# Patient Record
Sex: FEMALE | Race: ASIAN | NOT HISPANIC OR LATINO | Employment: FULL TIME | URBAN - METROPOLITAN AREA
[De-identification: names, ages, dates, MRNs, and addresses within clinical notes are randomized per-mention and may not be internally consistent; named-entity substitution may affect disease eponyms.]

---

## 2018-11-15 ENCOUNTER — TRANSFERRED RECORDS (OUTPATIENT)
Dept: HEALTH INFORMATION MANAGEMENT | Facility: CLINIC | Age: 53
End: 2018-11-15

## 2019-02-15 ENCOUNTER — TELEPHONE (OUTPATIENT)
Dept: OTOLARYNGOLOGY | Facility: CLINIC | Age: 54
End: 2019-02-15

## 2019-02-15 NOTE — TELEPHONE ENCOUNTER
Called out to the [facility].  Dr. Goodwin was already out of office and the referral wasn't in the chart. So there was a message left for Dr. Goodwin for her to send over that referral info through fax to our ENT department.  Also asked for clarification on if the referral is for Facial pain or for ENT.

## 2019-02-15 NOTE — TELEPHONE ENCOUNTER
Health Call Center    Phone Message    May a detailed message be left on voicemail: yes    Reason for Call: Other: Pt is being referred to the facial pain clinic by Dr Fabienne Goodwin at the [facility] TMD/Orofacial Pain clinic. Pt is in active treatment for left TMJ arthralgia, myofascial pain, and tension type headaches. Would like to rule out ear pathology as pt has modest improvement with current treatment plan. Please contact pt to discuss scheduling     Action Taken: Message routed to:  [facility] ENT Facial Pain Clinic

## 2019-02-19 NOTE — TELEPHONE ENCOUNTER
Dr. Goodwin called in looking for update on Referral and appt. with Dr. Rivera.  To further clarify; Dr. Goodwin is with Facial pain, and would like Dr. Rivera to rule our Ear Pathology...

## 2019-02-20 ENCOUNTER — DOCUMENTATION ONLY (OUTPATIENT)
Dept: CARE COORDINATION | Facility: CLINIC | Age: 54
End: 2019-02-20

## 2019-03-21 DIAGNOSIS — H91.90 HEARING LOSS, UNSPECIFIED HEARING LOSS TYPE, UNSPECIFIED LATERALITY: Primary | ICD-10-CM

## 2019-03-22 ENCOUNTER — OFFICE VISIT (OUTPATIENT)
Dept: AUDIOLOGY | Facility: CLINIC | Age: 54
End: 2019-03-22
Payer: COMMERCIAL

## 2019-03-22 ENCOUNTER — OFFICE VISIT (OUTPATIENT)
Dept: OTOLARYNGOLOGY | Facility: CLINIC | Age: 54
End: 2019-03-22
Payer: COMMERCIAL

## 2019-03-22 ENCOUNTER — PRE VISIT (OUTPATIENT)
Dept: OTOLARYNGOLOGY | Facility: CLINIC | Age: 54
End: 2019-03-22

## 2019-03-22 VITALS
BODY MASS INDEX: 22.63 KG/M2 | SYSTOLIC BLOOD PRESSURE: 96 MMHG | HEIGHT: 62 IN | WEIGHT: 123 LBS | DIASTOLIC BLOOD PRESSURE: 73 MMHG | HEART RATE: 61 BPM

## 2019-03-22 DIAGNOSIS — H91.93 HEARING DISORDER OF BOTH EARS: ICD-10-CM

## 2019-03-22 DIAGNOSIS — H93.13 TINNITUS OF BOTH EARS: Primary | ICD-10-CM

## 2019-03-22 DIAGNOSIS — M26.609 TMJ (TEMPOROMANDIBULAR JOINT SYNDROME): Primary | ICD-10-CM

## 2019-03-22 DIAGNOSIS — H91.90 HEARING LOSS, UNSPECIFIED HEARING LOSS TYPE, UNSPECIFIED LATERALITY: ICD-10-CM

## 2019-03-22 ASSESSMENT — MIFFLIN-ST. JEOR: SCORE: 1108.17

## 2019-03-22 ASSESSMENT — PAIN SCALES - GENERAL: PAINLEVEL: NO PAIN (0)

## 2019-03-22 NOTE — NURSING NOTE
"Chief Complaint   Patient presents with     Consult     orofacial pain    Blood pressure 96/73, pulse 61, height 1.57 m (5' 1.81\"), weight 55.8 kg (123 lb), not currently breastfeeding.      Nitin Antoine LPN    "

## 2019-03-22 NOTE — PROGRESS NOTES
AUDIOLOGY REPORT    SUMMARY: Audiology visit completed. See audiogram for results.      RECOMMENDATIONS: Follow-up with ENT.      Fransico Noyola.  Licensed Audiologist  [place] #[id number]

## 2019-03-22 NOTE — PATIENT INSTRUCTIONS
1.  You were seen in the ENT Clinic today by Dr. Rivera.  If you have any questions or concerns after your appointment, please call [phone]. Press option #1 for scheduling related needs.     2.  Please schedule an appointment for the following:   - Auditory Processing Evaluation. This is an external referral. You will be given this today. You will need to call one of the suggested clinics to set up an appointment. We can also fax this to the clinic you choose to schedule with. Please reach out to the clinic if you would like us to do this.         Bettye ACOSTA RN    The patient presents with a history of temporomandibular joint disorder and difficulty understanding speech at times. She will continue to work with the [facility] to manage her temporomandibular joint disorder and she will be referred to Morelia Olivares,  for evaluation of a possible Speech Processing Disorder.

## 2019-03-22 NOTE — PROGRESS NOTES
The patient presents with a history of difficulty understanding speech and with temporomandibular joint disorder. She is currently being treated for temporomandibular joint disorder with a dental appliance and this has been helpful to her. Her discomfort is improved. The patient denies sinusitis, rhinitis, facial pain, nasal obstruction or purulent nasal discharge. The patient denies chronic or recurrent tonsillitis, chronic or recurrent pharyngitis. The patient denies otalgia, otorrhea, eustachian tube dysfunction, ear infections, dizziness or tinnitus. Her Audiogram and Tympanogram are reviewed with her and they demonstrate normal hearing and 100% word recognition scores bilaterally with normal tympanograms.      This patient is seen in consultation at the request of Dr. Gloria Roberts.     All other systems were reviewed and they are either negative or they are not directly pertinent to this Otolaryngology examination.      Past Medical History:    Past Medical History:   Diagnosis Date     Back pain      Hearing problem     loss on left side     Irritable bowel syndrome      Migraines     visual     Problem, psychiatric     stress     Somatic dysfunction      Vitamin D deficiency        Past Surgical History:    Past Surgical History:   Procedure Laterality Date     EYE SURGERY      eye lift both eyes     HYSTERECTOMY, PAP NO LONGER INDICATED      lap hysterctomy, cervix removed, benign     nasa l surgery      septal surg with plastic surgery       Medications:      Current Outpatient Medications:      omeprazole (PRILOSEC) 20 MG capsule, Take 1 capsule (20 mg) by mouth daily 30-60 minutes before eating. (Patient not taking: Reported on 3/22/2019), Disp: 30 capsule, Rfl: 0    Allergies:    Animal dander; Dust mite extract; Mold; Perfume; and Pollen extract    Physical Examination:    The patient is a well developed, well nourished female in no apparent distress.  She is normocephalic, atraumatic with pupils  equally round and reactive to light.    Oral Cavity Examination:  Normal mucosa with no masses or lesions  Nasal Examination: Normal mucosa with no masses or lesions  Ear Examination: Ear canals clear, tympanic membranes and middle ear spaces normal  Neurological Examination: Facial nerve function intact and symmetric  Integumentary Examination: No lesions on the skin of the head and neck  Neck Examination: No masses or lesions, no lymphadenopathy  Endocrine Examination: Normal thyroid examination  Temporomandibular Joint Examination: Malrotation of the temporomandibular joints bilaterally.     Assessment and Plan:    The patient presents with a history of temporomandibular joint disorder and difficulty understanding speech at times. She will continue to work with the [facility] to manage her temporomandibular joint disorder and she will be referred to Morelia Olivares, PhD for evaluation of a possible Speech Processing Disorder.     CC: Dr. Gloria Roberts

## 2019-03-22 NOTE — LETTER
RE: Roxana Yadav  [address] [place] [address]     Dear Colleague,    Thank you for referring your patient, Roxana Yadav, to the [facility] EAR [facility]SE AND [facility]AT at [facility]. Please see a copy of my visit note below.    The patient presents with a history of difficulty understanding speech and with temporomandibular joint disorder. She is currently being treated for temporomandibular joint disorder with a dental appliance and this has been helpful to her. Her discomfort is improved. The patient denies sinusitis, rhinitis, facial pain, nasal obstruction or purulent nasal discharge. The patient denies chronic or recurrent tonsillitis, chronic or recurrent pharyngitis. The patient denies otalgia, otorrhea, eustachian tube dysfunction, ear infections, dizziness or tinnitus. Her Audiogram and Tympanogram are reviewed with her and they demonstrate normal hearing and 100% word recognition scores bilaterally with normal tympanograms.      This patient is seen in consultation at the request of Dr. Gloria Roberts.     All other systems were reviewed and they are either negative or they are not directly pertinent to this Otolaryngology examination.      Past Medical History:    Past Medical History:   Diagnosis Date     Back pain      Hearing problem     loss on left side     Irritable bowel syndrome      Migraines     visual     Problem, psychiatric     stress     Somatic dysfunction      Vitamin D deficiency        Past Surgical History:    Past Surgical History:   Procedure Laterality Date     EYE SURGERY      eye lift both eyes     HYSTERECTOMY, PAP NO LONGER INDICATED      lap hysterctomy, cervix removed, benign     nasa l surgery      septal surg with plastic surgery     Medications:    Current Outpatient Medications:      omeprazole (PRILOSEC) 20 MG capsule, Take 1 capsule (20 mg) by mouth daily 30-60 minutes before eating. (Patient not taking: Reported  on 3/22/2019), Disp: 30 capsule, Rfl: 0    Allergies:    Animal dander; Dust mite extract; Mold; Perfume; and Pollen extract    Physical Examination:    The patient is a well developed, well nourished female in no apparent distress.  She is normocephalic, atraumatic with pupils equally round and reactive to light.    Oral Cavity Examination:  Normal mucosa with no masses or lesions  Nasal Examination: Normal mucosa with no masses or lesions  Ear Examination: Ear canals clear, tympanic membranes and middle ear spaces normal  Neurological Examination: Facial nerve function intact and symmetric  Integumentary Examination: No lesions on the skin of the head and neck  Neck Examination: No masses or lesions, no lymphadenopathy  Endocrine Examination: Normal thyroid examination  Temporomandibular Joint Examination: Malrotation of the temporomandibular joints bilaterally.     Assessment and Plan:    The patient presents with a history of temporomandibular joint disorder and difficulty understanding speech at times. She will continue to work with the [facility] to manage her temporomandibular joint disorder and she will be referred to Morelia Olivares, PhD for evaluation of a possible Speech Processing Disorder.     CC: Dr. Gloria Roberts    Again, thank you for allowing me to participate in the care of your patient.      Sincerely,    Mike Rivera MD

## 2019-06-21 ENCOUNTER — TELEPHONE (OUTPATIENT)
Dept: OPHTHALMOLOGY | Facility: CLINIC | Age: 54
End: 2019-06-21

## 2019-07-10 ENCOUNTER — OFFICE VISIT (OUTPATIENT)
Dept: OPTOMETRY | Facility: CLINIC | Age: 54
End: 2019-07-10
Payer: COMMERCIAL

## 2019-07-10 ENCOUNTER — APPOINTMENT (OUTPATIENT)
Dept: OPTOMETRY | Facility: CLINIC | Age: 54
End: 2019-07-10

## 2019-07-10 DIAGNOSIS — H52.4 MYOPIA WITH PRESBYOPIA OF BOTH EYES: Primary | ICD-10-CM

## 2019-07-10 DIAGNOSIS — H04.123 DRY EYES: ICD-10-CM

## 2019-07-10 DIAGNOSIS — H52.13 MYOPIA WITH PRESBYOPIA OF BOTH EYES: Primary | ICD-10-CM

## 2019-07-10 ASSESSMENT — REFRACTION_CURRENTRX
OS_SPHERE: -4.50
OS_BASECURVE: 8.5
OS_DIAMETER: 14.1
OS_ADD: HIGH
OD_ADD: MED
OD_DIAMETER: 14.1
OS_BRAND: DAILIES TOTAL 1 MULTIFOCAL
OD_SPHERE: -3.50
OD_BASECURVE: 8.5
OD_BRAND: DAILIES TOTAL 1 MULTIFOCAL

## 2019-07-10 ASSESSMENT — EXTERNAL EXAM - RIGHT EYE: OD_EXAM: NORMAL

## 2019-07-10 ASSESSMENT — CONF VISUAL FIELD
OS_NORMAL: 1
OD_NORMAL: 1

## 2019-07-10 ASSESSMENT — REFRACTION_WEARINGRX
OD_CYLINDER: +1.00
OS_CYLINDER: SPHERE
OD_SPHERE: -4.25
OD_AXIS: 110
OS_ADD: +2.25
OS_SPHERE: -4.75
OD_ADD: +2.25

## 2019-07-10 ASSESSMENT — CUP TO DISC RATIO
OD_RATIO: 0.50
OS_RATIO: 0.50

## 2019-07-10 ASSESSMENT — VISUAL ACUITY
METHOD: SNELLEN - LINEAR
CORRECTION_TYPE: GLASSES
OS_CC: 20/20
OD_CC: 20/20

## 2019-07-10 ASSESSMENT — REFRACTION_MANIFEST
OD_SPHERE: -4.25
OD_CYLINDER: +1.00
OS_SPHERE: -4.75
OS_CYLINDER: SPHERE
OD_AXIS: 105

## 2019-07-10 ASSESSMENT — SLIT LAMP EXAM - LIDS
COMMENTS: 1+ MGD
COMMENTS: 1+ MGD

## 2019-07-10 ASSESSMENT — TONOMETRY
OD_IOP_MMHG: 14
OS_IOP_MMHG: 11
IOP_METHOD: ICARE

## 2019-07-10 ASSESSMENT — EXTERNAL EXAM - LEFT EYE: OS_EXAM: NORMAL

## 2019-07-10 NOTE — PROGRESS NOTES
A/P  1.) Myopia/Presbyopia each eye  -Doing well in current glasses, alternates full PAL's and computer PAL's. No change in Rx today  -Part-time CL wearer for social occasions. Previously in monovision left eye near with decent response, but improved with MF today  -CL trials given, Rx updated. Okay to order if working well. If she does not prefer MF optics we can return to monovision in similar power to habitual, but I would rec switching to AV Oasys 1 day (Astig right eye)    2.) Dry Eye each eye  -Start AT 2-3x/day, warm compresses  -Reviewed further treatment options if eyes continue to bother her, but we will start conservatively    Monitor 1 year eye exam, sooner prn

## 2019-09-26 ENCOUNTER — TELEPHONE (OUTPATIENT)
Dept: GASTROENTEROLOGY | Facility: CLINIC | Age: 54
End: 2019-09-26

## 2019-09-26 ENCOUNTER — OFFICE VISIT (OUTPATIENT)
Dept: INTERNAL MEDICINE | Facility: CLINIC | Age: 54
End: 2019-09-26
Payer: COMMERCIAL

## 2019-09-26 VITALS
DIASTOLIC BLOOD PRESSURE: 53 MMHG | TEMPERATURE: 97.7 F | BODY MASS INDEX: 23.93 KG/M2 | WEIGHT: 121.9 LBS | SYSTOLIC BLOOD PRESSURE: 85 MMHG | OXYGEN SATURATION: 95 % | HEIGHT: 60 IN | HEART RATE: 67 BPM

## 2019-09-26 DIAGNOSIS — E55.9 VITAMIN D DEFICIENCY: ICD-10-CM

## 2019-09-26 DIAGNOSIS — Z86.79 HISTORY OF IRREGULAR HEARTBEAT: ICD-10-CM

## 2019-09-26 DIAGNOSIS — Z76.89 ENCOUNTER TO ESTABLISH CARE WITH NEW DOCTOR: Primary | ICD-10-CM

## 2019-09-26 DIAGNOSIS — R06.02 SHORTNESS OF BREATH: ICD-10-CM

## 2019-09-26 DIAGNOSIS — E78.2 MIXED HYPERLIPIDEMIA: ICD-10-CM

## 2019-09-26 DIAGNOSIS — Z12.39 BREAST CANCER SCREENING: ICD-10-CM

## 2019-09-26 DIAGNOSIS — Z12.11 COLON CANCER SCREENING: ICD-10-CM

## 2019-09-26 LAB
ANION GAP SERPL CALCULATED.3IONS-SCNC: 6 MMOL/L (ref 3–14)
BUN SERPL-MCNC: 14 MG/DL (ref 7–30)
CALCIUM SERPL-MCNC: 9.1 MG/DL (ref 8.5–10.1)
CHLORIDE SERPL-SCNC: 104 MMOL/L (ref 94–109)
CHOLEST SERPL-MCNC: 238 MG/DL
CO2 SERPL-SCNC: 28 MMOL/L (ref 20–32)
CREAT SERPL-MCNC: 0.63 MG/DL (ref 0.52–1.04)
DEPRECATED CALCIDIOL+CALCIFEROL SERPL-MC: 33 UG/L (ref 20–75)
ERYTHROCYTE [DISTWIDTH] IN BLOOD BY AUTOMATED COUNT: 12.3 % (ref 10–15)
GFR SERPL CREATININE-BSD FRML MDRD: >90 ML/MIN/{1.73_M2}
GLUCOSE SERPL-MCNC: 93 MG/DL (ref 70–99)
HCT VFR BLD AUTO: 42.8 % (ref 35–47)
HDLC SERPL-MCNC: 117 MG/DL
HGB BLD-MCNC: 14 G/DL (ref 11.7–15.7)
LDLC SERPL CALC-MCNC: 106 MG/DL
MCH RBC QN AUTO: 31.6 PG (ref 26.5–33)
MCHC RBC AUTO-ENTMCNC: 32.7 G/DL (ref 31.5–36.5)
MCV RBC AUTO: 97 FL (ref 78–100)
NONHDLC SERPL-MCNC: 122 MG/DL
PLATELET # BLD AUTO: 230 10E9/L (ref 150–450)
POTASSIUM SERPL-SCNC: 3.9 MMOL/L (ref 3.4–5.3)
RBC # BLD AUTO: 4.43 10E12/L (ref 3.8–5.2)
SODIUM SERPL-SCNC: 139 MMOL/L (ref 133–144)
TRIGL SERPL-MCNC: 79 MG/DL
TSH SERPL DL<=0.005 MIU/L-ACNC: 2.1 MU/L (ref 0.4–4)
WBC # BLD AUTO: 4 10E9/L (ref 4–11)

## 2019-09-26 ASSESSMENT — ENCOUNTER SYMPTOMS
MUSCLE CRAMPS: 0
NAIL CHANGES: 0
DIFFICULTY URINATING: 1
PALPITATIONS: 1
NECK MASS: 0
HEADACHES: 1
DECREASED CONCENTRATION: 1
EXERCISE INTOLERANCE: 1
ARTHRALGIAS: 1
DYSURIA: 0
ORTHOPNEA: 0
SINUS PAIN: 0
LEG PAIN: 1
INCREASED ENERGY: 1
POLYDIPSIA: 0
LOSS OF CONSCIOUSNESS: 0
HEMATURIA: 0
WEAKNESS: 1
SEIZURES: 0
TASTE DISTURBANCE: 0
DECREASED LIBIDO: 1
WEIGHT LOSS: 0
TREMORS: 0
STIFFNESS: 1
SLEEP DISTURBANCES DUE TO BREATHING: 0
MUSCLE WEAKNESS: 1
FEVER: 0
FLANK PAIN: 0
DECREASED APPETITE: 0
SINUS CONGESTION: 0
PARALYSIS: 0
HYPERTENSION: 0
DEPRESSION: 1
SYNCOPE: 0
ALTERED TEMPERATURE REGULATION: 0
DISTURBANCES IN COORDINATION: 0
HOARSE VOICE: 0
NECK PAIN: 1
SMELL DISTURBANCE: 0
SKIN CHANGES: 0
SPEECH CHANGE: 0
NUMBNESS: 0
CHILLS: 0
PANIC: 0
TINGLING: 0
JOINT SWELLING: 0
HOT FLASHES: 0
FATIGUE: 1
SORE THROAT: 0
MEMORY LOSS: 0
POLYPHAGIA: 0
HYPOTENSION: 0
INSOMNIA: 1
DIZZINESS: 0
HALLUCINATIONS: 0
NIGHT SWEATS: 0
WEIGHT GAIN: 0
BACK PAIN: 1
POOR WOUND HEALING: 0
TROUBLE SWALLOWING: 0
MYALGIAS: 1
NERVOUS/ANXIOUS: 1
LIGHT-HEADEDNESS: 0

## 2019-09-26 ASSESSMENT — MIFFLIN-ST. JEOR: SCORE: 1068.81

## 2019-09-26 ASSESSMENT — PAIN SCALES - GENERAL: PAINLEVEL: NO PAIN (0)

## 2019-09-26 NOTE — NURSING NOTE
Chief Complaint   Patient presents with     Establish Care     pt here to establish care       Asiya Jeffery CMA at 8:24 AM on 9/26/2019.

## 2019-09-26 NOTE — PATIENT INSTRUCTIONS
[facility] Medication Refill Request Information:  * Please contact your pharmacy regarding ANY request for medication refills.  ** [facility] Prescription Fax = [phone]  * Please allow 3 business days for routine medication refills.  * Please allow 5 business days for controlled substance medication refills.     [facility] Test Result notification information:  *You will be notified with in 7-10 days of your appointment day regarding the results of your test.  If you are on MyChart you will be notified as soon as the provider has reviewed the results and signed off on them.    [facility]: [phone]

## 2019-09-26 NOTE — PROGRESS NOTES
CC: establish care  HPI    Ms. Roxana Yadav is a 54 year old female with a history of dyslipidemia, TMJ, and migraines who presents for an annual check up and establishment of care.   The difference between establish care, problem-based and routine physical office visits was discussed with the patient who opted to discuss (multiple) problems today.  We reviewed all of her concerns and agreed to focus on her most concerning ones first. Complete past medical history was reviewed as well.    # Dyspnea on exertion  Patient has been running 4-5 blocks >/= 5 days a week since June and has felt disproportional amount of SOB with activity, fatigue, knees giving out, and poor coordination. Prior to this she had not been exercising regularly. She denies similar symptoms in the past.  No associated chest pain or myalgias.    # Heart pounding  Patient intermittently, spontaneously develops pounding in her chest. This most recently occurred when she was at rest in bed last night. She limits caffeine intake and does not take any medications regularly. She had cardiac imaging in the past, but this was negative for findings. No associated loss of consciousness, chest pain, SOB, lightheadedness, or falls.    # other concerns:  -would like a skin survey for moles     Answers for HPI/ROS submitted by the patient on 9/26/2019--these were acknowledged and will continue to be addressed at subsequent visits   General Symptoms: Yes  Skin Symptoms: Yes  HENT Symptoms: Yes  EYE SYMPTOMS: No  HEART SYMPTOMS: Yes  LUNG SYMPTOMS: No  INTESTINAL SYMPTOMS: No  URINARY SYMPTOMS: Yes  GYNECOLOGIC SYMPTOMS: Yes  BREAST SYMPTOMS: No  SKELETAL SYMPTOMS: Yes  BLOOD SYMPTOMS: No  NERVOUS SYSTEM SYMPTOMS: Yes  MENTAL HEALTH SYMPTOMS: Yes  Fever: No  Loss of appetite: No  Weight loss: No  Weight gain: No  Fatigue: Yes  Night sweats: No  Chills: No  Increased stress: Yes  Excessive hunger: No  Excessive thirst: No  Feeling hot or cold when others believe  the temperature is normal: No  Loss of height: No  Post-operative complications: No  Surgical site pain: No  Hallucinations: No  Change in or Loss of Energy: Yes  Hyperactivity: No  Confusion: No  Changes in hair: No  Changes in moles/birth marks: No  Itching: Yes  Rashes: No  Changes in nails: No  Acne: No  Hair in places you don't want it: No  Change in facial hair: Yes  Non-healing sores: No  Scarring: No  Flaking of skin: No  Color changes of hands/feet in cold : No  Sun sensitivity: No  Skin thickening: No  Ear pain: Yes  Ear discharge: No  Hearing loss: Yes  Tinnitus: Yes  Nosebleeds: No  Congestion: No  Sinus pain: No  Trouble swallowing: No   Voice hoarseness: No  Mouth sores: No  Sore throat: No  Tooth pain: Yes  Gum tenderness: Yes  Bleeding gums: No  Change in taste: No  Change in sense of smell: No  Dry mouth: No  Hearing aid used: No  Neck lump: No  Chest pain or pressure: No  Fast or irregular heartbeat: Yes  Pain in legs with walking: Yes  Trouble breathing while lying down: No  Fingers or toes appear blue: No  High blood pressure: No  Low blood pressure: No  Fainting: No  Murmurs: No  Pacemaker: No  Varicose veins: Yes  Edema or swelling: Yes  Wake up at night with shortness of breath: No  Light-headedness: No  Exercise intolerance: Yes  Trouble holding urine or incontinence: Yes  Pain or burning: No  Trouble starting or stopping: No  Increased frequency of urination: Yes  Blood in urine: No  Decreased frequency of urination: No  Frequent nighttime urination: No  Flank pain: No  Difficulty emptying bladder: Yes  Back pain: Yes  Muscle aches: Yes  Neck pain: Yes  Swollen joints: No  Joint pain: Yes  Bone pain: No  Muscle cramps: No  Muscle weakness: Yes  Joint stiffness: Yes  Bone fracture: No  Trouble with coordination: No  Dizziness or trouble with balance: No  Fainting or black-out spells: No  Memory loss: No  Headache: Yes  Seizures: No  Speech problems: No  Tingling: No  Tremor: No  Weakness:  Yes  Difficulty walking: No  Paralysis: No  Numbness: No  Bleeding or spotting between periods: No  Heavy or painful periods: No  Irregular periods: No  Vaginal discharge: Yes  Hot flashes: No  Vaginal dryness: Yes  Genital ulcers: No  Reduced libido: Yes  Painful intercourse: Yes  Difficulty with sexual arousal: Yes  Post-menopausal bleeding: No  Nervous or Anxious: Yes  Depression: Yes  Trouble sleeping: Yes  Trouble thinking or concentrating: Yes  Mood changes: Yes  Panic attacks: No      Past Medical History:   Diagnosis Date     Back pain      Hearing problem     loss on left side     Irritable bowel syndrome      Migraines     visual     Problem, psychiatric     stress     Somatic dysfunction      Vitamin D deficiency      Past Surgical History:   Procedure Laterality Date     EYE SURGERY      eye lift both eyes     HYSTERECTOMY, PAP NO LONGER INDICATED      lap hysterctomy, cervix removed, benign     nasa l surgery      septal surg with plastic surgery     Family History   Problem Relation Age of Onset     Neurologic Disorder Mother      Blood Disease Mother      Depression Mother      Muscular Disorder Mother      Family History Negative Father      Depression Father      Muscular Disorder Father      Macular Degeneration Father      Blood Disease Sister         Low red blood cells     Glaucoma Sister      Blood Disease Sister         Low red blood cell cound     Depression Sister      Muscular Disorder Sister      Depression Sister      Muscular Disorder Sister      Glaucoma Other      Breast Cancer No family hx of      Social History     Socioeconomic History     Marital status:      Spouse name: None     Number of children: None     Years of education: None     Highest education level: None   Tobacco Use     Smoking status: Never Smoker     Smokeless tobacco: Never Used   Substance and Sexual Activity     Alcohol use: No     Drug use: No     Sexual activity: Yes     Partners: Male     Birth  "control/protection: None       Exam:  BP (!) 85/53 (BP Location: Right arm, Patient Position: Sitting, Cuff Size: Adult Regular)   Pulse 67   Temp 97.7  F (36.5  C) (Oral)   Ht 1.523 m (4' 11.96\")   Wt 55.3 kg (121 lb 14.4 oz)   SpO2 95%   BMI 23.84 kg/m    121 lbs 14.4 oz    Physical exam -performed by Dr. Avila  Physical Examination:    General:  Conversant, generally healthy appearing, no acute distress  Head: atraumatic  Eyes:  Pupils 2-3 mm, sclera white, EOM's full, conjunctiva moist, no periorbital swelling    Neck:  Trachea midline, Full AROM, supple, thyroid smooth, symmetric, not enlarged, no nodules, no neck lymphadenopathy  Lungs:  Clear to auscultation throughout, no wheezes, rhonchi or rales. Normal respiratory effort and no intercostal retractions.  C/V:  Regular rate and rhythm, no murmurs, rubs or gallops.  No JVD, no carotid bruits. Radial and DP pulses 2+, equal and regular.  Abdomen:  Not distended.  Bowel sounds active.  No tenderness, no hepatosplenomegaly or masses.  No CVA tenderness or masses.  Lymph:  No cervical lymph nodes.  Neuro: Alert and oriented, face symmetric. Able to get on/off exam table without assistance.  Strength grossly intact. No tremor.  Gait steady.   M/S:   No joint deformities noted.   Skin:   Normal temperature., turgor and texture. No rashes, suspicious lesions,  jaundice or ulcers    Extremities:  No peripheral edema, no digital cyanosis  Psych:  Alert and oriented. Appropriate affect.  Not psychomotor slowed.  No signs of anxiety or agitation.    EKG today (my read):  SR 62 with sinus arrhythmia (patient asymptomatic with this), normal axis. Normal OH, QRS and QT intervals.  No ST-T changes.  No significant Q waves.  Normal EKG.    Component      Latest Ref Rng & Units 9/26/2019   Sodium      133 - 144 mmol/L 139   Potassium      3.4 - 5.3 mmol/L 3.9   Chloride      94 - 109 mmol/L 104   Carbon Dioxide      20 - 32 mmol/L 28   Anion Gap      3 - 14 mmol/L 6 "   Glucose      70 - 99 mg/dL 93   Urea Nitrogen      7 - 30 mg/dL 14   Creatinine      0.52 - 1.04 mg/dL 0.63   GFR Estimate      >60 mL/min/1.73:m2 >90   GFR Estimate If Black      >60 mL/min/1.73:m2 >90   Calcium      8.5 - 10.1 mg/dL 9.1   WBC      4.0 - 11.0 10e9/L 4.0   RBC Count      3.8 - 5.2 10e12/L 4.43   Hemoglobin      11.7 - 15.7 g/dL 14.0   Hematocrit      35.0 - 47.0 % 42.8   MCV      78 - 100 fl 97   MCH      26.5 - 33.0 pg 31.6   MCHC      31.5 - 36.5 g/dL 32.7   RDW      10.0 - 15.0 % 12.3   Platelet Count      150 - 450 10e9/L 230   Cholesterol      <200 mg/dL 238 (H)   Triglycerides      <150 mg/dL 79   HDL Cholesterol      >49 mg/dL 117   LDL Cholesterol Calculated      <100 mg/dL 106 (H)   Non HDL Cholesterol      <130 mg/dL 122   Vitamin D Deficiency screening      20 - 75 ug/L 33   TSH      0.40 - 4.00 mU/L 2.10     ASSESSMENT    Ms. Roxana Yadav is here to establish care.    # Dyspnea on exertion  Low exercise tolerance despite daily exercise/running 4-5 blocks since June. DDX includes deconditioning, hypothyroidism, anemia, cardiac etiology.   -Vit D levels  -CBC  -BMP  -TSH with reflex T4    # Heart pounding  History of stress ECHO (1/2009) terminated due to fatigue. EKG today was notable for a non-symptomatic sinus arrhthymia. No chest pain or palpitations on presentation today. Given symptoms at rest (low exercise tolerance, SOB with activity, spontaneous heart pounding), additional work up will be pursued.   -Stress ECHO  -48 hours Holter monitor     HCM:  -Mammogram referral  -Colonoscopy referral  -DTAP booster next visit  -lipid panel      Follow up in a couple of weeks to review lab tests and above concerns, and multiple other complaints (see ROS).     Note scribed by medical student, Isma Medeiros, MS4.  9/26/2019    Teaching Physician  Disclosure:    I was present with the medical student who participated in the service and in the documentation of this note.  I have verified the  history and personally performed the physical exam and medical decision making, and have verified the content of the note, which accurately reflects my assessment of the patient and the plan of care    Yessica Avila M.D.  Internal Medicine   pager [phone]

## 2019-10-08 ENCOUNTER — ANCILLARY PROCEDURE (OUTPATIENT)
Dept: MAMMOGRAPHY | Facility: CLINIC | Age: 54
End: 2019-10-08
Attending: INTERNAL MEDICINE
Payer: COMMERCIAL

## 2019-10-08 DIAGNOSIS — Z12.39 BREAST CANCER SCREENING: ICD-10-CM

## 2019-10-11 ENCOUNTER — ANCILLARY PROCEDURE (OUTPATIENT)
Dept: CARDIOLOGY | Facility: CLINIC | Age: 54
End: 2019-10-11
Attending: INTERNAL MEDICINE
Payer: COMMERCIAL

## 2019-10-11 ENCOUNTER — TRANSFERRED RECORDS (OUTPATIENT)
Dept: HEALTH INFORMATION MANAGEMENT | Facility: CLINIC | Age: 54
End: 2019-10-11

## 2019-10-11 DIAGNOSIS — Z86.79 HISTORY OF IRREGULAR HEARTBEAT: ICD-10-CM

## 2019-10-11 DIAGNOSIS — R06.02 SHORTNESS OF BREATH: ICD-10-CM

## 2019-10-11 PROCEDURE — 93225 XTRNL ECG REC<48 HRS REC: CPT | Mod: ZF

## 2019-10-11 PROCEDURE — 0298T ZZC EXT ECG > 48HR TO 21 DAY REVIEW AND INTERPRETATN: CPT | Performed by: INTERNAL MEDICINE

## 2019-10-11 RX ADMIN — Medication 5 ML: at 13:22

## 2019-10-11 NOTE — PROGRESS NOTES
Per Yessica Hook , patient to have 2 day zio monitor placed.  Diagnosis: History of irregular heart beats  Monitor placed: Yes  Patient Instructed: Yes  Patient verbalized understanding: Yes  Holter # [id number]  Placed By: Rachel Del Toro   Documented By: Rachel Del Toro

## 2019-10-11 NOTE — LETTER
Patient:  Roxana Yadav  :   1965  MRN:     [id number]        Ms. Roxana Yadav  [address] [place] [address]        2019    Dear Ms. Yadav,    Thank you for choosing the [facility] for your healthcare needs.  We appreciate the opportunity to serve you.    The following is a message about your recent test results.     As we discussed, your holter monitor results were acceptable.  No concerning findings.  Dr. Avila

## 2019-10-14 ENCOUNTER — TELEPHONE (OUTPATIENT)
Dept: GASTROENTEROLOGY | Facility: CLINIC | Age: 54
End: 2019-10-14

## 2019-10-18 ENCOUNTER — TELEPHONE (OUTPATIENT)
Dept: GASTROENTEROLOGY | Facility: CLINIC | Age: 54
End: 2019-10-18

## 2019-10-18 DIAGNOSIS — Z12.11 ENCOUNTER FOR SCREENING COLONOSCOPY: Primary | ICD-10-CM

## 2019-10-18 NOTE — TELEPHONE ENCOUNTER
Patient scheduled for Colonoscopy    Indication for procedure. Screening    Referring Provider.Yessica Avila MD     ? No     Arrival time verified? 2 PM    Facility location verified? [address] [place] [address]    Instructions given regarding prep and procedure Instructions reviewed    Prep Type Golytely    Are you taking any anticoagulants or blood thinners? No     Instructions given? N/a     Electronic implanted devices? Denies     Pre procedure teaching completed? Yes    Transportation from procedure?  policy reviewed. Instructed patient to have someone stay with her for 6 hours post exam    H&P / Pre op physical completed? N/a

## 2019-10-21 ENCOUNTER — HOSPITAL ENCOUNTER (OUTPATIENT)
Facility: AMBULATORY SURGERY CENTER | Age: 54
End: 2019-10-21
Attending: INTERNAL MEDICINE
Payer: COMMERCIAL

## 2019-10-21 VITALS
WEIGHT: 121 LBS | RESPIRATION RATE: 16 BRPM | HEART RATE: 56 BPM | SYSTOLIC BLOOD PRESSURE: 97 MMHG | HEIGHT: 59 IN | TEMPERATURE: 97.2 F | BODY MASS INDEX: 24.39 KG/M2 | DIASTOLIC BLOOD PRESSURE: 51 MMHG | OXYGEN SATURATION: 99 %

## 2019-10-21 LAB — COLONOSCOPY: NORMAL

## 2019-10-21 RX ORDER — LIDOCAINE 40 MG/G
CREAM TOPICAL
Status: DISCONTINUED | OUTPATIENT
Start: 2019-10-21 | End: 2019-10-21 | Stop reason: HOSPADM

## 2019-10-21 RX ORDER — FENTANYL CITRATE 50 UG/ML
INJECTION, SOLUTION INTRAMUSCULAR; INTRAVENOUS PRN
Status: DISCONTINUED | OUTPATIENT
Start: 2019-10-21 | End: 2019-10-21 | Stop reason: HOSPADM

## 2019-10-21 RX ORDER — ONDANSETRON 2 MG/ML
4 INJECTION INTRAMUSCULAR; INTRAVENOUS EVERY 6 HOURS PRN
Status: DISCONTINUED | OUTPATIENT
Start: 2019-10-21 | End: 2019-10-22 | Stop reason: HOSPADM

## 2019-10-21 RX ORDER — NALOXONE HYDROCHLORIDE 0.4 MG/ML
.1-.4 INJECTION, SOLUTION INTRAMUSCULAR; INTRAVENOUS; SUBCUTANEOUS
Status: DISCONTINUED | OUTPATIENT
Start: 2019-10-21 | End: 2019-10-22 | Stop reason: HOSPADM

## 2019-10-21 RX ORDER — FLUMAZENIL 0.1 MG/ML
0.2 INJECTION, SOLUTION INTRAVENOUS
Status: DISCONTINUED | OUTPATIENT
Start: 2019-10-21 | End: 2019-10-22 | Stop reason: HOSPADM

## 2019-10-21 RX ORDER — SIMETHICONE
LIQUID (ML) MISCELLANEOUS PRN
Status: DISCONTINUED | OUTPATIENT
Start: 2019-10-21 | End: 2019-10-21 | Stop reason: HOSPADM

## 2019-10-21 RX ORDER — ONDANSETRON 4 MG/1
4 TABLET, ORALLY DISINTEGRATING ORAL EVERY 6 HOURS PRN
Status: DISCONTINUED | OUTPATIENT
Start: 2019-10-21 | End: 2019-10-22 | Stop reason: HOSPADM

## 2019-10-21 RX ORDER — ONDANSETRON 2 MG/ML
4 INJECTION INTRAMUSCULAR; INTRAVENOUS
Status: DISCONTINUED | OUTPATIENT
Start: 2019-10-21 | End: 2019-10-21 | Stop reason: HOSPADM

## 2019-10-21 ASSESSMENT — MIFFLIN-ST. JEOR: SCORE: 1049.48

## 2019-10-21 NOTE — DISCHARGE INSTRUCTIONS
Discharge Instructions after Colonoscopy  or Sigmoidoscopy    Today you had a __x__ Colonoscopy ____ Sigmoidoscopy    Activity and Diet  You were given medicine for pain. You may be dizzy or sleepy.  For 24 hours:    Do not drive or use heavy equipment.    Do not make important decisions.    Do not drink any alcohol.  You may return to your normal diet and medicines.    Discomfort    Air was placed in your colon during the exam in order to see it. Walking helps to pass the air.    You may take Tylenol (acetaminophen) for pain unless your doctor has told you not to.  Do not take aspirin or ibuprofen (Advil, Motrin, or other anti-inflammatory  drugs) for _____ days.    Follow-up  ____ We took small tissue samples or polyps to study. Your doctor will call you with the results  within two weeks.    When to call:    Call right away if you have:    Unusual pain in belly or chest pain not relieved with passing air.    More than 1 to 2 Tablespoons of bleeding from your rectum.    Fever above 100.6  F (37.5  C).    If you have severe pain, bleeding, or shortness of breath, go to an emergency room.    If you have questions, call:  Monday to Friday, 7 a.m. to 4:30 p.m.  Endoscopy: [phone] (We may have to call you back)    After hours  Hospital: [phone] (Ask for the GI fellow on call)

## 2019-10-22 LAB — COPATH REPORT: NORMAL

## 2019-10-29 NOTE — PROGRESS NOTES
Precharting: 10 minutes, not including trying to track down holter results report    CC:  F/u CHRISTINE    HPI:  I last saw her on 9/26/19 to [facility].  Addressed CHRISTINE, heart pounding, skin survey for moles and reviewed entire medical history  EKG with sinus arrhythmia, o/w normal  Labs remarkable only for total cho 238, , discussed lifestyle modifications, no rx indicated at this time and repeat lipids in one year.  At last OV, ordered stress echo, 48 hour holter and some HCM related tests  See results below, reviewed with patient  Still with same symptoms  Discussed sub optimal stress echo due to not achieving target HR, still interested in ruling out cardiac cause of her symptoms.  She would not be able to do treadmill due to right hip pain.  Discussed possible reactive airways/exercise induce asthma, agrees to PFT's  Will hold off for not on cardiology/pulmonary consultation  Wanted me to check some moles, still wants dermatology referral, no personal or family hx skin cancer, lesions asymptomatic  Right hip pain with running, doing physiotherapy  Thinking about joining a gym  Discussed referral to ortho/sports and she is interested in this  Reviewed HCM, due for Td      .  We reviewed her labs and test results:  Component      Latest Ref Rng & Units 9/26/2019   Sodium      133 - 144 mmol/L 139   Potassium      3.4 - 5.3 mmol/L 3.9   Chloride      94 - 109 mmol/L 104   Carbon Dioxide      20 - 32 mmol/L 28   Anion Gap      3 - 14 mmol/L 6   Glucose      70 - 99 mg/dL 93   Urea Nitrogen      7 - 30 mg/dL 14   Creatinine      0.52 - 1.04 mg/dL 0.63   GFR Estimate      >60 mL/min/1.73:m2 >90   GFR Estimate If Black      >60 mL/min/1.73:m2 >90   Calcium      8.5 - 10.1 mg/dL 9.1   WBC      4.0 - 11.0 10e9/L 4.0   RBC Count      3.8 - 5.2 10e12/L 4.43   Hemoglobin      11.7 - 15.7 g/dL 14.0   Hematocrit      35.0 - 47.0 % 42.8   MCV      78 - 100 fl 97   MCH      26.5 - 33.0 pg 31.6   MCHC      31.5 - 36.5 g/dL  32.7   RDW      10.0 - 15.0 % 12.3   Platelet Count      150 - 450 10e9/L 230   Cholesterol      <200 mg/dL 238 (H)   Triglycerides      <150 mg/dL 79   HDL Cholesterol      >49 mg/dL 117   LDL Cholesterol Calculated      <100 mg/dL 106 (H)   Non HDL Cholesterol      <130 mg/dL 122   Vitamin D Deficiency screening      20 - 75 ug/L 33   TSH      0.40 - 4.00 mU/L 2.10     Stress echo 10/11/19:  Interpretation Summary  Unable to reach target heart rate (>85% PMHR) or adequate rate pressure  product (>25.000).  Non-diagnostic stress echocardiogram.  Test terminated for leg fatigue.  Normal functional capacity.  No diagnostic regional wall motion abnormalities at rest and peak exercise.  Normal LV size and function at rest. The LVEF is 55-60% and unable to  increases function adequately with LVEF 60-65% at peak exercise.  Normal blood pressure response to exercise.  No ECG evidence of ischemia at rest and peak exercise.  No reported chest discomfort.  No significant valvular disease noted on routine screening color flow Doppler  and pulsed Doppler examination. The ascending aortic size appears normal.    Ziopatch 10/11/19:  Sinus rhythm, symptoms only with sinus rhythm      Past Medical History:   Diagnosis Date     Back pain      Hearing problem     loss on left side     Irregular heart beat      Irritable bowel syndrome      Migraines     visual     Problem, psychiatric     stress     Somatic dysfunction      Vitamin D deficiency      Current Outpatient Medications   Medication Sig Dispense Refill     cholecalciferol (VITAMIN D3) 5000 units TABS tablet Take by mouth as needed       KP PSEUDOEPHEDRINE HCL PO        Allergies   Allergen Reactions     Animal Dander      Dust Mite Extract      Mold      Perfume      Pollen Extract      /68 (BP Location: Right arm, Patient Position: Sitting, Cuff Size: Adult Regular)   Pulse 69   Resp 16   Wt 55.2 kg (121 lb 11.2 oz)   Breastfeeding? No   BMI 24.58 kg/m    3 mm  lesion lateral upper chest, brown with rough texture c/w SK  2 1-2 cm benign appearing brown papules, no scale, ulceration, irregular borders    Roxana was seen today for results and derm problem.    Diagnoses and all orders for this visit:    CHRISTINE (dyspnea on exertion)  -     Echocardiogram Dobutamine Stress; Future  -     Cancel: CARDIOLOGY EVAL ADULT REFERRAL  -     General PFT Lab (Please always keep checked); Future  -     Pulmonary Function Test; Future    Hip pain, right  -     ORTHOPEDICS ADULT REFERRAL    Need for vaccination  -     TD PRESERVATIVE FREE, AGE >=7 YR    Skin lesions  -     DERMATOLOGY REFERRAL      F/U at least one week after tests completed.    Total time spent 25 minutes.  More than 50% of the time spent with Ms. Yadav on counseling / coordinating her care      Yessica Avila M.D.  Internal Medicine  [facility]   pager [phone]

## 2019-10-30 ENCOUNTER — OFFICE VISIT (OUTPATIENT)
Dept: INTERNAL MEDICINE | Facility: CLINIC | Age: 54
End: 2019-10-30
Payer: COMMERCIAL

## 2019-10-30 VITALS
BODY MASS INDEX: 24.58 KG/M2 | HEART RATE: 69 BPM | SYSTOLIC BLOOD PRESSURE: 102 MMHG | DIASTOLIC BLOOD PRESSURE: 68 MMHG | RESPIRATION RATE: 16 BRPM | WEIGHT: 121.7 LBS

## 2019-10-30 DIAGNOSIS — M25.551 HIP PAIN, RIGHT: ICD-10-CM

## 2019-10-30 DIAGNOSIS — L98.9 SKIN LESIONS: ICD-10-CM

## 2019-10-30 DIAGNOSIS — R06.09 DOE (DYSPNEA ON EXERTION): Primary | ICD-10-CM

## 2019-10-30 DIAGNOSIS — Z23 NEED FOR VACCINATION: ICD-10-CM

## 2019-10-30 ASSESSMENT — PAIN SCALES - GENERAL: PAINLEVEL: NO PAIN (0)

## 2019-10-30 NOTE — NURSING NOTE
Chief Complaint   Patient presents with     Results     follow up on test results     Derm Problem     would like spot on right side checked       Lawrence Weaver CMA (AAMA) at 8:10 AM on 10/30/2019

## 2019-11-04 ENCOUNTER — HOSPITAL ENCOUNTER (OUTPATIENT)
Dept: CARDIOLOGY | Facility: CLINIC | Age: 54
Discharge: HOME | End: 2019-11-04
Attending: INTERNAL MEDICINE | Admitting: INTERNAL MEDICINE
Payer: COMMERCIAL

## 2019-11-04 DIAGNOSIS — R06.09 DOE (DYSPNEA ON EXERTION): ICD-10-CM

## 2019-11-04 PROCEDURE — 40000264 ECHO STRESS ECHOCARDIOGRAM

## 2019-11-04 PROCEDURE — 93350 STRESS TTE ONLY: CPT | Mod: 26 | Performed by: INTERNAL MEDICINE

## 2019-11-04 PROCEDURE — 25500064 ZZH RX 255 OP 636: Performed by: INTERNAL MEDICINE

## 2019-11-04 PROCEDURE — 25000128 H RX IP 250 OP 636: Performed by: INTERNAL MEDICINE

## 2019-11-04 PROCEDURE — 93016 CV STRESS TEST SUPVJ ONLY: CPT | Performed by: INTERNAL MEDICINE

## 2019-11-04 PROCEDURE — 93321 DOPPLER ECHO F-UP/LMTD STD: CPT | Mod: 26 | Performed by: INTERNAL MEDICINE

## 2019-11-04 PROCEDURE — 93018 CV STRESS TEST I&R ONLY: CPT | Performed by: INTERNAL MEDICINE

## 2019-11-04 PROCEDURE — 93325 DOPPLER ECHO COLOR FLOW MAPG: CPT | Mod: 26 | Performed by: INTERNAL MEDICINE

## 2019-11-04 PROCEDURE — 25000125 ZZHC RX 250: Performed by: INTERNAL MEDICINE

## 2019-11-04 RX ORDER — METOPROLOL TARTRATE 1 MG/ML
1-20 INJECTION, SOLUTION INTRAVENOUS
Status: ACTIVE | OUTPATIENT
Start: 2019-11-04 | End: 2019-11-04

## 2019-11-04 RX ORDER — DOBUTAMINE HYDROCHLORIDE 200 MG/100ML
10-50 INJECTION INTRAVENOUS CONTINUOUS
Status: ACTIVE | OUTPATIENT
Start: 2019-11-04 | End: 2019-11-04

## 2019-11-04 RX ORDER — SODIUM CHLORIDE 9 MG/ML
INJECTION, SOLUTION INTRAVENOUS CONTINUOUS
Status: ACTIVE | OUTPATIENT
Start: 2019-11-04 | End: 2019-11-04

## 2019-11-04 RX ORDER — ATROPINE SULFATE 0.4 MG/ML
.2-2 AMPUL (ML) INJECTION
Status: COMPLETED | OUTPATIENT
Start: 2019-11-04 | End: 2019-11-04

## 2019-11-04 RX ADMIN — DOBUTAMINE HYDROCHLORIDE 10 MCG/KG/MIN: 200 INJECTION INTRAVENOUS at 08:39

## 2019-11-04 RX ADMIN — ATROPINE SULFATE 0.4 MG: 0.4 INJECTION, SOLUTION INTRAMUSCULAR; INTRAVENOUS; SUBCUTANEOUS at 08:50

## 2019-11-04 RX ADMIN — METOPROLOL TARTRATE 3 MG: 1 INJECTION, SOLUTION INTRAVENOUS at 08:55

## 2019-11-04 RX ADMIN — PERFLUTREN 10 ML: 6.52 INJECTION, SUSPENSION INTRAVENOUS at 08:54

## 2019-11-04 NOTE — LETTER
Patient:  Roxana Yadav  :   1965  MRN:     [id number]        Ms. Roxana Yadav  [address] [place] [address]        2019    Dear Ms. Yadav,    Thank you for choosing the [facility] for your healthcare needs.  We appreciate the opportunity to serve you.    The following is a note in regards to your recent test results.     Your stress test results are negative/normal.  Dr. Avila

## 2019-11-04 NOTE — PROGRESS NOTES
Pt here for dobutamine stress test.  Test, meds and side effects reviewed with patient.  Achieved target HR at 40 mcg Dobutamine and a total of 0.4mg IV atropine.  Gave a total of 3mg IV Metoprolol to bring HR back to baseline.  Post monitoring complete and VSS.  Pt escorted out to the gold waiting room.

## 2019-11-05 DIAGNOSIS — R06.09 DOE (DYSPNEA ON EXERTION): ICD-10-CM

## 2019-11-05 LAB
DLCOUNC-%PRED-PRE: 93 %
DLCOUNC-PRE: 18.03 ML/MIN/MMHG
DLCOUNC-PRED: 19.26 ML/MIN/MMHG
ERV-%PRED-PRE: 78 %
ERV-PRE: 0.75 L
ERV-PRED: 0.95 L
EXPTIME-PRE: 5.86 SEC
FEF2575-%PRED-PRE: 117 %
FEF2575-PRE: 2.66 L/SEC
FEF2575-PRED: 2.27 L/SEC
FEFMAX-%PRED-PRE: 80 %
FEFMAX-PRE: 5 L/SEC
FEFMAX-PRED: 6.24 L/SEC
FEV1-%PRED-PRE: 109 %
FEV1-PRE: 2.51 L
FEV1FEV6-PRE: 82 %
FEV1FEV6-PRED: 82 %
FEV1FVC-PRE: 82 %
FEV1FVC-PRED: 81 %
FEV1SVC-PRE: 76 %
FEV1SVC-PRED: 74 %
FIFMAX-PRE: 3.35 L/SEC
FRCPLETH-%PRED-PRE: 93 %
FRCPLETH-PRE: 2.4 L
FRCPLETH-PRED: 2.58 L
FVC-%PRED-PRE: 108 %
FVC-PRE: 3.08 L
FVC-PRED: 2.84 L
IC-%PRED-PRE: 118 %
IC-PRE: 2.57 L
IC-PRED: 2.17 L
RVPLETH-%PRED-PRE: 95 %
RVPLETH-PRE: 1.66 L
RVPLETH-PRED: 1.73 L
TLCPLETH-%PRED-PRE: 107 %
TLCPLETH-PRE: 4.97 L
TLCPLETH-PRED: 4.6 L
VA-%PRED-PRE: 102 %
VA-PRE: 4.56 L
VC-%PRED-PRE: 106 %
VC-PRE: 3.31 L
VC-PRED: 3.12 L

## 2019-11-12 ENCOUNTER — PATIENT OUTREACH (OUTPATIENT)
Dept: GASTROENTEROLOGY | Facility: CLINIC | Age: 54
End: 2019-11-12

## 2019-11-12 NOTE — PROGRESS NOTES
Attempted to reach the patient 3 times.  Today left a voicemail for the patient stating her pathology results below.  Left my direct number with any questions or concerns      Zuly Rondon MD Berryman, Yessica GUERRERO MD   Cc: Jailene Dumont, RN             1 hyperplastic polyp with good prep. Plan for repeat screening cscope in 10 years.     Jailene -- can you please call the patient and let her know she has a hyperplastic polyp and that her next colonoscopy should be in 10 years?     Thank you,   Zuly

## 2019-11-16 ENCOUNTER — TRANSFERRED RECORDS (OUTPATIENT)
Dept: HEALTH INFORMATION MANAGEMENT | Facility: CLINIC | Age: 54
End: 2019-11-16

## 2019-11-25 LAB — INTERPRETATION ECG - MUSE: NORMAL

## 2019-12-21 ENCOUNTER — OFFICE VISIT (OUTPATIENT)
Dept: INTERNAL MEDICINE | Facility: CLINIC | Age: 54
End: 2019-12-21
Payer: COMMERCIAL

## 2019-12-21 VITALS
DIASTOLIC BLOOD PRESSURE: 68 MMHG | TEMPERATURE: 98.4 F | SYSTOLIC BLOOD PRESSURE: 102 MMHG | WEIGHT: 124 LBS | OXYGEN SATURATION: 95 % | HEIGHT: 62 IN | BODY MASS INDEX: 22.82 KG/M2 | RESPIRATION RATE: 18 BRPM | HEART RATE: 84 BPM

## 2019-12-21 DIAGNOSIS — J00 ACUTE INFECTIVE RHINITIS: ICD-10-CM

## 2019-12-21 DIAGNOSIS — R30.0 DYSURIA: Primary | ICD-10-CM

## 2019-12-21 DIAGNOSIS — J06.9 VIRAL UPPER RESPIRATORY TRACT INFECTION: ICD-10-CM

## 2019-12-21 LAB
ALBUMIN UR-MCNC: NEGATIVE MG/DL
APPEARANCE UR: CLEAR
BACTERIA #/AREA URNS HPF: ABNORMAL /HPF
BILIRUB UR QL STRIP: NEGATIVE
COLOR UR AUTO: ABNORMAL
GLUCOSE UR STRIP-MCNC: NEGATIVE MG/DL
HGB UR QL STRIP: ABNORMAL
KETONES UR STRIP-MCNC: NEGATIVE MG/DL
LEUKOCYTE ESTERASE UR QL STRIP: NEGATIVE
MUCOUS THREADS #/AREA URNS LPF: PRESENT /LPF
NITRATE UR QL: NEGATIVE
PH UR STRIP: 6 PH (ref 5–7)
RBC #/AREA URNS AUTO: 1 /HPF (ref 0–2)
SOURCE: ABNORMAL
SP GR UR STRIP: 1.01 (ref 1–1.03)
UROBILINOGEN UR STRIP-MCNC: 0 MG/DL (ref 0–2)
WBC #/AREA URNS AUTO: 6 /HPF (ref 0–5)

## 2019-12-21 RX ORDER — FLUTICASONE PROPIONATE 50 MCG
2 SPRAY, SUSPENSION (ML) NASAL DAILY
Qty: 16 G | Refills: 0 | Status: SHIPPED | OUTPATIENT
Start: 2019-12-21

## 2019-12-21 RX ORDER — OXYMETAZOLINE HYDROCHLORIDE 0.05 G/100ML
2 SPRAY NASAL 2 TIMES DAILY
Qty: 37 ML | Refills: 0 | Status: SHIPPED | OUTPATIENT
Start: 2019-12-21

## 2019-12-21 ASSESSMENT — MIFFLIN-ST. JEOR: SCORE: 1110.71

## 2019-12-21 ASSESSMENT — PAIN SCALES - GENERAL: PAINLEVEL: NO PAIN (0)

## 2019-12-21 NOTE — NURSING NOTE
Chief Complaint   Patient presents with     Recheck Medication     pt. is having blood in her urine and abnormal discharge, after she was treated at another clinic for UTI. pt. states that she has  been having a cold for thepast few days, pt. states that she has having pain her left ear       Prabha Hernandez, EMT

## 2019-12-21 NOTE — PATIENT INSTRUCTIONS
[facility] Medication Refill Request Information:  * Please contact your pharmacy regarding ANY request for medication refills.  ** [facility] Prescription Fax = [phone]  * Please allow 3 business days for routine medication refills.  * Please allow 5 business days for controlled substance medication refills.     [facility] Test Result notification information:  *You will be notified with in 7-10 days of your appointment day regarding the results of your test.  If you are on MyChart you will be notified as soon as the provider has reviewed the results and signed off on them.      Patient Education     Viral Upper Respiratory Illness (Adult)    You have a viral upper respiratory illness (URI), which is another term for the common cold. This illness is contagious during the first few days. It is spread through the air by coughing and sneezing. It may also be spread by direct contact (touching the sick person and then touching your own eyes, nose, or mouth). Frequent handwashing will decrease risk of spread. Most viral illnesses go away within 7 to 10 days with rest and simple home remedies. Sometimes the illness may last for several weeks. Antibiotics will not kill a virus, and they are generally not prescribed for this condition.  Home care    If symptoms are severe, rest at home for the first 2 to 3 days. When you resume activity, don't let yourself get too tired.    Don't smoke. If you need help stopping, talk with your healthcare provider.    Avoid being exposed to cigarette smoke (yours or others ).    You may use acetaminophen or ibuprofen to control pain and fever, unless another medicine was prescribed. If you have chronic liver or kidney disease, have ever had a stomach ulcer or gastrointestinal bleeding, or are taking blood-thinning medicines, talk with your healthcare provider before using these medicines. Aspirin should never be given to anyone under 18 years of age who is ill with a viral  infection or fever. It may cause severe liver or brain damage.    Your appetite may be poor, so a light diet is fine. Stay well hydrated by drinking 6 to 8 glasses of fluids per day (water, soft drinks, juices, tea, or soup). Extra fluids will help loosen secretions in the nose and lungs.    Over-the-counter cold medicines will not shorten the length of time you re sick, but they may be helpful for the following symptoms: cough, sore throat, and nasal and sinus congestion. If you take prescription medicines, ask your healthcare provider or pharmacist which over-the-counter medicines are safe to use. (Note: Don't use decongestants if you have high blood pressure.)  Follow-up care  Follow up with your healthcare provider, or as advised.  When to seek medical advice  Call your healthcare provider right away if any of these occur:    Cough with lots of colored sputum (mucus)    Severe headache; face, neck, or ear pain    Difficulty swallowing due to throat pain    Fever of 100.4 F (38 C) or higher, or as directed by your healthcare provider  Call 911  Call 911 if any of these occur:    Chest pain, shortness of breath, wheezing, or difficulty breathing    Coughing up blood    Very severe pain with swallowing, especially if it goes along with a muffled voice   Date Last Reviewed: 6/1/2018 2000-2018 [employer]. [address] [place] [address], [place] [address]. All rights reserved. This information is not intended as a substitute for professional medical care. Always follow your healthcare professional's instructions.

## 2019-12-21 NOTE — PROGRESS NOTES
CC: urinary symptoms, URI    HPI:  URI, started with sore throat 5 days ago, now sinus congestion and drainage, has been gargling with vinegar water (helped), and using pseudoephedrine (advised against given history of palpitations),  last night had a tiny pain left ear, no fevers, chills, sweats, chest pain, no cough, no nausea/vomiting, diarrhea, has had construction    Urinary symptoms, pain, urgency  UA 12/5/19 at [facility] with mod blood, small LET, 30-49 WBC's, bacteria present, given Septra x 3 days  UA 11/21/19 Moderate blood,small LET, 5-9 RBC's, occas WBC, greater than 100k klebsiella, sensitive to all but not to ampicillin.  Given Septra x 5 days  Wet prep, GC, chlamydia negative 11/21/19 11/16/19 [facility], had UA, started antibiotic but received call to stop because UA/C not suggestive of infection (per her report)  Referred to Urology on 12/5/19, if symptoms did not improve    3 days ago had pink color when wiping after urination, otherwise urinary symptoms resolved    f/u test results (dobutamine stress, PFT's):  Hx CHRISTINE, heart pounding, EKG with sinus arrhythmia, had stress echo, 48 hour holter (no concerning findings), labs, sub optimal stress echo due to not achieving target HR.  PFT's normal, ordered dobutamine echo (normal/negative)  Currently asymptomatic.  Avoid caffeine, increase exercise advised.      10 point ROS otherwise negative.    Patient Active Problem List   Diagnosis     Vitamin D deficiency     Hair loss     Chronic rhinitis     Hyperlipidemia LDL goal <130     Tension headache     Leukopenia     Current Outpatient Medications   Medication Sig Dispense Refill     cholecalciferol (VITAMIN D3) 5000 units TABS tablet Take by mouth as needed       fluticasone (FLONASE) 50 MCG/ACT nasal spray Spray 2 sprays into both nostrils daily For 2 weeks 16 g 0     KP PSEUDOEPHEDRINE HCL PO        oxymetazoline (AFRIN) 0.05 % nasal spray Spray 2 sprays into both nostrils 2 times daily For no more  "than 5 days. 37 mL 0     Allergies   Allergen Reactions     Adhesive Tape Rash     Animal Dander      Dust Mite Extract      Mold      Perfume      Pollen Extract      /68 (BP Location: Right arm, Patient Position: Chair, Cuff Size: Adult Regular)   Pulse 84   Temp 98.4  F (36.9  C) (Oral)   Resp 18   Ht 1.575 m (5' 2\")   Wt 56.2 kg (124 lb)   SpO2 95%   Breastfeeding No   BMI 22.68 kg/m    Physical Examination:    General:  Conversant, generally healthy appearing, no acute distress  Head: atraumatic  Eyes:  Pupils 2-3 mm, sclera white, EOM's full, conjunctiva moist, no periorbital swelling    Ears:  TM's normal, EAC's patent, clear of cerumen  Nose:  Nasal passages show swollen turbinates bilaterally, no sinus tenderness  Throat/Mouth:  No pharyngeal erythema, exudate, ulcers, oral mucosa and tongue moist, normal hard and soft palate  Neck:  Trachea midline, Full AROM, supple, thyroid smooth, symmetric, not enlarged, no nodules, no neck lymphadenopathy  Lungs:  Clear to auscultation throughout, no wheezes, rhonchi or rales. Normal respiratory effort and no intercostal retractions.  C/V:  Regular rate and rhythm, no murmurs, rubs or gallops.  No JVD, no carotid bruits. Radial and DP pulses 2+, equal and regular.  Lymph:  No cervical lymph nodes.  Neuro: Alert and oriented, face symmetric. Able to get on/off exam table without assistance.  Strength grossly intact. No tremor.  Gait steady.   M/S:   Hands: No joint deformities noted.  No joint swelling.  Skin:   Normal temperature., turgor and texture. No rashes, suspicious lesions, or jaundice on exposed skin surfaces.   Extremities:  No peripheral edema, no digital cyanosis  Psych:  Alert and oriented. Appropriate affect.  Not psychomotor slowed.  No signs of anxiety or agitation.    Roxana was seen today for recheck medication.    Diagnoses and all orders for this visit:    Dysuria  -     UA with Micro reflex to Culture; Future    Viral upper " respiratory tract infection  Supportive care, information given (see AVS)    Acute infective rhinitis  -     fluticasone (FLONASE) 50 MCG/ACT nasal spray; Spray 2 sprays into both nostrils daily For 2 weeks  -     oxymetazoline (AFRIN) 0.05 % nasal spray; Spray 2 sprays into both nostrils 2 times daily For no more than 5 days.    Follow up as needed.    Yessica Avila M.D.  Internal Medicine  [facility]   pager [phone]

## 2019-12-21 NOTE — LETTER
Patient:  Roxana Yadav  :   1965  MRN:     [id number]        Ms. Roxana Yadav  [address] [place] [address]        2019    Dear Ms. Yadav,    Thank you for choosing the [facility] for your healthcare needs.  We appreciate the opportunity to serve you.    The following are your recent test results.     Results for orders placed or performed in visit on 19   UA with Microscopic reflex to Culture     Status: Abnormal   Result Value Ref Range    Color Urine Straw     Appearance Urine Clear     Glucose Urine Negative NEG^Negative mg/dL    Bilirubin Urine Negative NEG^Negative    Ketones Urine Negative NEG^Negative mg/dL    Specific Gravity Urine 1.011 1.003 - 1.035    Blood Urine Small (A) NEG^Negative    pH Urine 6.0 5.0 - 7.0 pH    Protein Albumin Urine Negative NEG^Negative mg/dL    Urobilinogen mg/dL 0.0 0.0 - 2.0 mg/dL    Nitrite Urine Negative NEG^Negative    Leukocyte Esterase Urine Negative NEG^Negative    Source Midstream Urine     WBC Urine 6 (H) 0 - 5 /HPF    RBC Urine 1 0 - 2 /HPF    Bacteria Urine Few (A) NEG^Negative /HPF    Mucous Urine Present (A) NEG^Negative /LPF       There is a small amount of blood in your urine, but the analysis is not suggestive of bladder infection.  I recommend that you follow through on the referral to urology as provided by [facility].    Please contact us if you have any questions or concerns.  We look forward to serving your needs in the future.      Sincerely,    Yessica Avila MD

## 2020-09-21 ENCOUNTER — TRANSFERRED RECORDS (OUTPATIENT)
Dept: HEALTH INFORMATION MANAGEMENT | Facility: CLINIC | Age: 55
End: 2020-09-21

## 2020-10-16 ENCOUNTER — OFFICE VISIT (OUTPATIENT)
Dept: OPTOMETRY | Facility: CLINIC | Age: 55
End: 2020-10-16
Payer: COMMERCIAL

## 2020-10-16 DIAGNOSIS — H52.203 MYOPIA OF BOTH EYES WITH ASTIGMATISM AND PRESBYOPIA: Primary | ICD-10-CM

## 2020-10-16 DIAGNOSIS — H52.13 MYOPIA OF BOTH EYES WITH ASTIGMATISM AND PRESBYOPIA: Primary | ICD-10-CM

## 2020-10-16 DIAGNOSIS — H04.123 DRY EYES: ICD-10-CM

## 2020-10-16 DIAGNOSIS — Z83.511 FAMILY HISTORY OF GLAUCOMA: ICD-10-CM

## 2020-10-16 DIAGNOSIS — H52.4 MYOPIA OF BOTH EYES WITH ASTIGMATISM AND PRESBYOPIA: Primary | ICD-10-CM

## 2020-10-16 ASSESSMENT — REFRACTION_MANIFEST
OD_AXIS: 115
OD_CYLINDER: +1.50
OS_SPHERE: -4.75
OD_SPHERE: -4.25
OS_CYLINDER: SPHERE

## 2020-10-16 ASSESSMENT — REFRACTION_CURRENTRX
OD_DIAMETER: 14.1
OS_ADD: HIGH
OD_BASECURVE: 8.5
OS_DIAMETER: 14.1
OS_BRAND: DAILIES TOTAL 1 MULTIFOCAL
OD_ADD: MED
OD_SPHERE: -3.50
OS_BASECURVE: 8.5
OD_BRAND: DAILIES TOTAL 1 MULTIFOCAL
OS_SPHERE: -4.50

## 2020-10-16 ASSESSMENT — TONOMETRY
OS_IOP_MMHG: 11
IOP_METHOD: ICARE
OD_IOP_MMHG: 14

## 2020-10-16 ASSESSMENT — REFRACTION_WEARINGRX
OS_SPHERE: -4.25
OD_ADD: +2.25
OD_AXIS: 105
OS_CYLINDER: SPHERE
OS_ADD: +2.25
OD_SPHERE: -4.25
OD_CYLINDER: +1.75
OD_SPHERE: -2.75
OD_SPHERE: -3.75
OS_CYLINDER: +0.50
OS_SPHERE: -4.75
OD_ADD: +1.25
OS_ADD: +1.25
OD_AXIS: 105
OS_AXIS: 080
OD_AXIS: 095
OD_CYLINDER: +1.00
OS_SPHERE: -3.25
OD_CYLINDER: +1.00
OS_CYLINDER: SPHERE

## 2020-10-16 ASSESSMENT — VISUAL ACUITY
OD_CC: 20/30-1
CORRECTION_TYPE: GLASSES
METHOD: SNELLEN - LINEAR
OS_CC: 20/20-1

## 2020-10-16 ASSESSMENT — SLIT LAMP EXAM - LIDS
COMMENTS: 1+ MGD
COMMENTS: 1+ MGD

## 2020-10-16 ASSESSMENT — CUP TO DISC RATIO
OD_RATIO: 0.50
OS_RATIO: 0.50

## 2020-10-16 ASSESSMENT — CONF VISUAL FIELD
OS_NORMAL: 1
OD_NORMAL: 1

## 2020-10-16 ASSESSMENT — EXTERNAL EXAM - RIGHT EYE: OD_EXAM: NORMAL

## 2020-10-16 ASSESSMENT — EXTERNAL EXAM - LEFT EYE: OS_EXAM: NORMAL

## 2020-10-16 NOTE — PROGRESS NOTES
A/P  1.) Myopia/Astigmatism/Presbyopia each eye  -Mild change in spec Rx, updated today  -Uses full Rx PAL's and then computer PAL's. Her computer PAL's measure significantly off today. I would rec updating this pair with new Rx for intermediate/near  -Rarely wearing CL. Defers new eval today. Can continue with previous Rx prn    2.) Dry Eye each eye  -Asymptomatic but with mild-mod corneal stain  -Start AT 2x/day    3.) Family Hx glaucoma  -Uncle, sister + glaucoma. One sister who is glaucoma suspect  -Baseline OCT normal today    Monitor 1 year eye exam, sooner prn

## 2021-07-26 ENCOUNTER — TRANSFERRED RECORDS (OUTPATIENT)
Dept: HEALTH INFORMATION MANAGEMENT | Facility: CLINIC | Age: 56
End: 2021-07-26

## 2021-10-18 ENCOUNTER — OFFICE VISIT (OUTPATIENT)
Dept: OPTOMETRY | Facility: CLINIC | Age: 56
End: 2021-10-18
Payer: COMMERCIAL

## 2021-10-18 DIAGNOSIS — Z83.511 FAMILY HISTORY OF GLAUCOMA: Primary | ICD-10-CM

## 2021-10-18 DIAGNOSIS — H04.123 DRY EYES: ICD-10-CM

## 2021-10-18 DIAGNOSIS — H52.13 MYOPIA OF BOTH EYES WITH ASTIGMATISM AND PRESBYOPIA: ICD-10-CM

## 2021-10-18 DIAGNOSIS — H52.4 MYOPIA OF BOTH EYES WITH ASTIGMATISM AND PRESBYOPIA: ICD-10-CM

## 2021-10-18 DIAGNOSIS — H52.203 MYOPIA OF BOTH EYES WITH ASTIGMATISM AND PRESBYOPIA: ICD-10-CM

## 2021-10-18 ASSESSMENT — TONOMETRY
IOP_METHOD: ICARE
OD_IOP_MMHG: 10
OS_IOP_MMHG: 11

## 2021-10-18 ASSESSMENT — REFRACTION_MANIFEST
OD_CYLINDER: +1.50
OD_AXIS: 115
OS_CYLINDER: SPHERE
OS_ADD: +2.50
OD_ADD: +2.50
OS_SPHERE: -4.25
OD_SPHERE: -4.25

## 2021-10-18 ASSESSMENT — SLIT LAMP EXAM - LIDS
COMMENTS: 1+ MGD
COMMENTS: 1+ MGD

## 2021-10-18 ASSESSMENT — CUP TO DISC RATIO
OS_RATIO: 0.50
OD_RATIO: 0.50

## 2021-10-18 ASSESSMENT — REFRACTION_WEARINGRX
OS_CYLINDER: SPHERE
OD_AXIS: 115
OD_SPHERE: -2.75
OS_ADD: +1.25
OD_AXIS: 115
OS_SPHERE: -4.75
OS_SPHERE: -3.25
OD_SPHERE: -4.25
OD_ADD: +1.25
OD_ADD: +2.50
OS_ADD: +2.50
OD_CYLINDER: +1.50
OS_CYLINDER: SPHERE
OD_CYLINDER: +1.50

## 2021-10-18 ASSESSMENT — CONF VISUAL FIELD
OS_NORMAL: 1
OD_NORMAL: 1

## 2021-10-18 ASSESSMENT — EXTERNAL EXAM - RIGHT EYE: OD_EXAM: NORMAL

## 2021-10-18 ASSESSMENT — VISUAL ACUITY
METHOD: SNELLEN - LINEAR
OS_CC: 20/20-3
CORRECTION_TYPE: GLASSES
OD_CC: 20/20-1

## 2021-10-18 ASSESSMENT — EXTERNAL EXAM - LEFT EYE: OS_EXAM: NORMAL

## 2021-10-18 NOTE — PROGRESS NOTES
A/P  1.) Dry Eye each eye  -Moderate inferior corneal stain each eye. 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 One sister who is glaucoma suspect  -OCT normal, stable to last year  -IOP excellent today (10/11) and historically  -Baseline VF normal today  -Reviewed with pt, will continue to monitor with annual eye exam. No Tx recommended at this time  -Pt had questions on benefits of essential oils with glaucoma. I reviewed there is no peer reviewed literature showing benefit at this time. Avoid oils in the eyes    3.) Myopia/Astigmatism/Presbyopia each eye  -No change in Rx, continue with current pair. Has computer glasses at home that also work well    Monitor 1 year dilated eye exam, sooner prn    I have confirmed the patient's CC, HPI and reviewed Past Medical History, Past Surgical History, Social History, Family History, Problem List, Medication List and agree with Tech note.     Rox Redd, YUVAL DIALLOO MARLIS

## 2021-10-18 NOTE — PATIENT INSTRUCTIONS
Artificial tears: (Water-like consistency. Can be used during the daytime)  -Refresh Plus  -Refresh Optive  -Refresh Relieva  -Systane Ultra  -Systane Complete  -Systane Hydration  -Blink Tears  (Notes: Anything in a bottle has preservatives and can be used up to 4x/day. Preservative free vials can be used as much as necessary)    Gel drops: (Thicker consistency, may blur vision slightly. Can be used during the day or at night)  -Refresh Celluvisc  -Refresh Liquigel  -Refresh Optive Gel Drops  -Systane Gel Drops  -Blink Gel Drops    Ointments: (Very thick, these moisturize the best but can blur vision. Best used right before bedtime)  -Refresh PM  -Systane Nighttime  -Genteal Gel    Moisture Chamber Goggles:   -Tranquileyes    You can also use a warm wet washcloth - however this frequently loses heat quickly and can dry your skin out a bit so we recommend any of the above re-usable beaded/gel eyemasks      To purchase these products you can look over-the-counter at drugstores or purchase online at the following websites:  -[email or web address]

## 2022-06-15 ENCOUNTER — OFFICE VISIT (OUTPATIENT)
Dept: OPTOMETRY | Facility: CLINIC | Age: 57
End: 2022-06-15
Payer: COMMERCIAL

## 2022-06-15 DIAGNOSIS — H52.13 MYOPIA OF BOTH EYES WITH ASTIGMATISM AND PRESBYOPIA: ICD-10-CM

## 2022-06-15 DIAGNOSIS — H04.123 DRY EYES: ICD-10-CM

## 2022-06-15 DIAGNOSIS — H52.203 MYOPIA OF BOTH EYES WITH ASTIGMATISM AND PRESBYOPIA: ICD-10-CM

## 2022-06-15 DIAGNOSIS — H52.4 MYOPIA OF BOTH EYES WITH ASTIGMATISM AND PRESBYOPIA: ICD-10-CM

## 2022-06-15 DIAGNOSIS — Z83.511 FAMILY HISTORY OF GLAUCOMA: Primary | ICD-10-CM

## 2022-06-15 ASSESSMENT — VISUAL ACUITY
OS_CC: 20/40
METHOD: SNELLEN - LINEAR
OD_CC: 20/20-1
CORRECTION_TYPE: GLASSES

## 2022-06-15 ASSESSMENT — CUP TO DISC RATIO
OD_RATIO: 0.50
OS_RATIO: 0.50

## 2022-06-15 ASSESSMENT — REFRACTION_WEARINGRX
OD_AXIS: 115
SPECS_TYPE: PAL
OD_CYLINDER: +1.50
OS_SPHERE: -4.50
OD_ADD: +2.50
OD_SPHERE: -4.25
OS_ADD: +2.50
OS_CYLINDER: SPHERE

## 2022-06-15 ASSESSMENT — TONOMETRY
OS_IOP_MMHG: 12
IOP_METHOD: ICARE
OD_IOP_MMHG: 11

## 2022-06-15 ASSESSMENT — REFRACTION_MANIFEST
OD_SPHERE: -3.75
OS_CYLINDER: SPHERE
OD_CYLINDER: +1.50
OD_AXIS: 105
OS_SPHERE: -3.50

## 2022-06-15 ASSESSMENT — REFRACTION
OD_CYLINDER: +1.50
OS_CYLINDER: SPHERE
OD_SPHERE: -4.00
OD_AXIS: 105
OS_SPHERE: -3.00

## 2022-06-15 ASSESSMENT — EXTERNAL EXAM - LEFT EYE: OS_EXAM: NORMAL

## 2022-06-15 ASSESSMENT — EXTERNAL EXAM - RIGHT EYE: OD_EXAM: NORMAL

## 2022-06-15 ASSESSMENT — CONF VISUAL FIELD
OS_NORMAL: 1
OD_NORMAL: 1

## 2022-06-15 ASSESSMENT — SLIT LAMP EXAM - LIDS
COMMENTS: 1+ MGD
COMMENTS: 1+ MGD

## 2022-06-16 NOTE — PROGRESS NOTES
A/P  1.) Strong family Hx glaucoma  -Uncle, sister + glaucoma (advanced, diagnosed at young age and requiring glaucoma surgery). One sister who is glaucoma suspect  -OCT normal, stable to last year and baseline  -IOP excellent today (11/12) and historically  -Baseline VF normal last year  -Reviewed with pt, will continue to monitor with annual eye exam. No Tx recommended at this time    2.) Dry Eye each eye  -Moderate inferior corneal stain each eye. 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 Verified with damp ret  -Has full Rx and computer progressive    Monitor 1 year dilated eye exam, sooner prn

## 2023-09-27 ENCOUNTER — TELEPHONE (OUTPATIENT)
Dept: OPTOMETRY | Facility: CLINIC | Age: 58
End: 2023-09-27

## 2023-09-27 ENCOUNTER — OFFICE VISIT (OUTPATIENT)
Dept: OPTOMETRY | Facility: CLINIC | Age: 58
End: 2023-09-27
Payer: COMMERCIAL

## 2023-09-27 DIAGNOSIS — H52.10 MYOPIA WITH REGULAR ASTIGMATISM AND PRESBYOPIA: Primary | ICD-10-CM

## 2023-09-27 DIAGNOSIS — H52.4 MYOPIA WITH REGULAR ASTIGMATISM AND PRESBYOPIA: Primary | ICD-10-CM

## 2023-09-27 DIAGNOSIS — H52.229 MYOPIA WITH REGULAR ASTIGMATISM AND PRESBYOPIA: Primary | ICD-10-CM

## 2023-09-27 ASSESSMENT — REFRACTION_CURRENTRX
OD_BASECURVE: 8.5
OS_SPHERE: -3.25
OS_BASECURVE: 8.5
OS_BRAND: ACUVUE OASYS 1 DAY
OS_DIAMETER: 14.3
OD_CYLINDER: -1.25
OD_BRAND: ACUVUE OASYS 1 DAY ASTIGMATISM
OD_AXIS: 020
OD_SPHERE: -2.00
OD_DIAMETER: 14.3

## 2023-09-27 NOTE — PROGRESS NOTES
No office visit. CL order only    Contact Lens Billing  V-Code:  - Soft toric right,  Soft Sphere Left  Final Contact Lens Rx         Brand Base Curve Diameter Sphere Cylinder Axis    Right Acuvue Oasys 1 Day Astigmatism 8.5 14.3 -2.00 -1.25 020    Left Acuvue Oasys 1 Day 8.5 14.3 -3.25             Fait order mailed direct: [id number]   # of units:   1 90-pack right () @ $110 per box  1 90-pack left () @ $90 per box  + $7.95 shipping = $207.95 total    These are for cosmetic contact lenses.    Encounter Diagnosis   Name Primary?    Myopia with regular astigmatism and presbyopia Yes        Date of last eye exam: 6/15/22

## 2023-10-30 ENCOUNTER — OFFICE VISIT (OUTPATIENT)
Dept: OPTOMETRY | Facility: CLINIC | Age: 58
End: 2023-10-30
Payer: COMMERCIAL

## 2023-10-30 DIAGNOSIS — H52.229 MYOPIA WITH REGULAR ASTIGMATISM AND PRESBYOPIA: ICD-10-CM

## 2023-10-30 DIAGNOSIS — H40.003 GLAUCOMA SUSPECT, BILATERAL: Primary | ICD-10-CM

## 2023-10-30 DIAGNOSIS — H52.10 MYOPIA WITH REGULAR ASTIGMATISM AND PRESBYOPIA: ICD-10-CM

## 2023-10-30 DIAGNOSIS — Z83.511 FAMILY HISTORY OF GLAUCOMA IN SISTER: ICD-10-CM

## 2023-10-30 DIAGNOSIS — H52.4 MYOPIA WITH REGULAR ASTIGMATISM AND PRESBYOPIA: ICD-10-CM

## 2023-10-30 ASSESSMENT — REFRACTION_WEARINGRX
OD_ADD: +1.00
OD_CYLINDER: +1.50
OD_SPHERE: -2.25
OS_SPHERE: -3.50
OS_ADD: +2.50
OS_CYLINDER: SPHERE
OD_CYLINDER: +1.50
OD_AXIS: 105
OS_SPHERE: -2.00
OS_ADD: +1.00
OD_AXIS: 105
OD_SPHERE: -3.75
OS_CYLINDER: SPHERE
OD_ADD: +2.50

## 2023-10-30 ASSESSMENT — CONF VISUAL FIELD
OS_SUPERIOR_TEMPORAL_RESTRICTION: 0
OS_INFERIOR_TEMPORAL_RESTRICTION: 0
OD_SUPERIOR_TEMPORAL_RESTRICTION: 0
OS_NORMAL: 1
OD_NORMAL: 1
OS_INFERIOR_NASAL_RESTRICTION: 0
OD_INFERIOR_NASAL_RESTRICTION: 0
OS_SUPERIOR_NASAL_RESTRICTION: 0
OD_SUPERIOR_NASAL_RESTRICTION: 0
OD_INFERIOR_TEMPORAL_RESTRICTION: 0

## 2023-10-30 ASSESSMENT — EXTERNAL EXAM - RIGHT EYE: OD_EXAM: NORMAL

## 2023-10-30 ASSESSMENT — VISUAL ACUITY
OS_CC: 20/25
METHOD: SNELLEN - LINEAR
OD_CC: 20/20-2
CORRECTION_TYPE: GLASSES

## 2023-10-30 ASSESSMENT — REFRACTION_CURRENTRX
OS_BASECURVE: 8.5
OS_BASECURVE: 8.5
OS_DIAMETER: 14.3
OD_CYLINDER: -1.25
OS_BRAND: ACUVUE OASYS 1 DAY
OD_CYLINDER: -1.25
OD_AXIS: 020
OS_SPHERE: -3.25
OD_BRAND: ACUVUE OASYS 1 DAY ASTIGMATISM
OS_DIAMETER: 14.3
OD_BRAND: ACUVUE OASYS 1 DAY ASTIGMATISM
OS_BRAND: ACUVUE OASYS 1 DAY
OD_SPHERE: -2.00
OS_SPHERE: -2.00
OD_AXIS: 020
OD_BASECURVE: 8.5
OD_SPHERE: -2.00
OD_DIAMETER: 14.3
OD_DIAMETER: 14.3
OD_BASECURVE: 8.5

## 2023-10-30 ASSESSMENT — REFRACTION_MANIFEST
OD_SPHERE: -3.50
OD_AXIS: 107
OD_CYLINDER: +1.50
OS_SPHERE: -3.75
OS_CYLINDER: SPHERE

## 2023-10-30 ASSESSMENT — CUP TO DISC RATIO
OD_RATIO: 0.50
OS_RATIO: 0.50

## 2023-10-30 ASSESSMENT — TONOMETRY
OD_IOP_MMHG: 13
OS_IOP_MMHG: 12
IOP_METHOD: ICARE

## 2023-10-30 ASSESSMENT — SLIT LAMP EXAM - LIDS
COMMENTS: 1+ MGD
COMMENTS: 1+ MGD

## 2023-10-30 ASSESSMENT — EXTERNAL EXAM - LEFT EYE: OS_EXAM: NORMAL

## 2023-10-31 NOTE — PROGRESS NOTES
A/P  1.) Strong family Hx glaucoma  -Uncle, sister + glaucoma (advanced, diagnosed at young age and requiring glaucoma surgery). One sister who is glaucoma suspect  -OCT normal, stable to last year and baseline (2020)  -IOP excellent today (13/12) and historically  -Baseline VF normal 2021, consider repeating in the future for comparison  -Reviewed with pt, will continue to monitor with annual eye exam. No Tx recommended at this time    2.) Myopia/Astigmatism/Presbyopia each eye  -Largely stable spec Rx updated today  -No overall changes recommended in CL wilkerson (wearing occasionally only), though may try monovision left eye near to reduce dependence on readers  -Has full Rx and computer progressive    Monitor 1 year dilated eye exam, sooner prn

## 2024-09-23 ENCOUNTER — OFFICE VISIT (OUTPATIENT)
Dept: OPTOMETRY | Facility: CLINIC | Age: 59
End: 2024-09-23
Payer: COMMERCIAL

## 2024-09-23 DIAGNOSIS — Z83.511 FAMILY HISTORY OF GLAUCOMA IN SISTER: ICD-10-CM

## 2024-09-23 DIAGNOSIS — H52.10 MYOPIA WITH REGULAR ASTIGMATISM AND PRESBYOPIA: Primary | ICD-10-CM

## 2024-09-23 DIAGNOSIS — H52.4 MYOPIA WITH REGULAR ASTIGMATISM AND PRESBYOPIA: Primary | ICD-10-CM

## 2024-09-23 DIAGNOSIS — H52.229 MYOPIA WITH REGULAR ASTIGMATISM AND PRESBYOPIA: Primary | ICD-10-CM

## 2024-09-23 PROBLEM — F43.23 ADJUSTMENT DISORDER WITH MIXED ANXIETY AND DEPRESSED MOOD: Status: ACTIVE | Noted: 2023-06-07

## 2024-09-23 ASSESSMENT — REFRACTION_MANIFEST
OS_ADD: +2.50
OD_SPHERE: -3.75
OS_SPHERE: -3.50
OD_CYLINDER: +1.50
OD_AXIS: 107
OD_ADD: +2.50
OS_CYLINDER: SPHERE

## 2024-09-23 ASSESSMENT — REFRACTION_WEARINGRX
OS_SPHERE: -3.50
OD_AXIS: 105
OD_CYLINDER: +1.50
OD_SPHERE: -2.25
OS_SPHERE: -2.00
OS_CYLINDER: SPHERE
OS_CYLINDER: SPHERE
OS_ADD: +2.50
OD_ADD: +1.00
OD_ADD: +2.50
OD_CYLINDER: +1.50
OS_ADD: +1.00
OD_AXIS: 105
OD_SPHERE: -3.75

## 2024-09-23 ASSESSMENT — VISUAL ACUITY
OS_SC+: -1
OS_SC: 20/125
METHOD: SNELLEN - LINEAR
OD_SC: 20/80

## 2024-09-23 ASSESSMENT — CONF VISUAL FIELD
OD_NORMAL: 1
OS_SUPERIOR_NASAL_RESTRICTION: 0
OS_INFERIOR_TEMPORAL_RESTRICTION: 0
OS_INFERIOR_NASAL_RESTRICTION: 0
OD_SUPERIOR_TEMPORAL_RESTRICTION: 0
OS_NORMAL: 1
OD_SUPERIOR_NASAL_RESTRICTION: 0
OD_INFERIOR_TEMPORAL_RESTRICTION: 0
OS_SUPERIOR_TEMPORAL_RESTRICTION: 0
METHOD: COUNTING FINGERS
OD_INFERIOR_NASAL_RESTRICTION: 0

## 2024-09-23 ASSESSMENT — EXTERNAL EXAM - LEFT EYE: OS_EXAM: NORMAL

## 2024-09-23 ASSESSMENT — CUP TO DISC RATIO
OS_RATIO: 0.50
OD_RATIO: 0.50

## 2024-09-23 ASSESSMENT — REFRACTION_CURRENTRX
OD_DIAMETER: 14.3
OS_BASECURVE: 8.5
OD_BRAND: ACUVUE OASYS 1 DAY ASTIGMATISM
OD_SPHERE: -2.00
OS_BRAND: ACUVUE OASYS 1 DAY
OS_DIAMETER: 14.3
OD_AXIS: 020
OD_CYLINDER: -1.25
OD_BASECURVE: 8.5
OS_SPHERE: -2.00

## 2024-09-23 ASSESSMENT — TONOMETRY
OS_IOP_MMHG: 11
OD_IOP_MMHG: 12
IOP_METHOD: ICARE

## 2024-09-23 ASSESSMENT — SLIT LAMP EXAM - LIDS
COMMENTS: 1+ MGD
COMMENTS: 1+ MGD

## 2024-09-23 ASSESSMENT — EXTERNAL EXAM - RIGHT EYE: OD_EXAM: NORMAL

## 2024-09-23 NOTE — NURSING NOTE
Chief Complaints and History of Present Illnesses   Patient presents with    Annual Eye Exam     Pt here for routine eye exam.     Chief Complaint(s) and History of Present Illness(es)       Annual Eye Exam              Laterality: both eyes    Comments: Pt here for routine eye exam.              Comments    Pt was informed insurance may not cover exam due to it being before 1 year. Pt lost her glasses and would like to buy a new pair. Pt did not update glasses after last visit.     ZEINA Caraballo on 9/23/2024 at 2:11 PM

## 2024-09-24 NOTE — PROGRESS NOTES
A/P  1.) Strong family Hx glaucoma  -Uncle, sister + glaucoma (advanced, diagnosed at young age and requiring glaucoma surgery). One sister who is glaucoma suspect  -OCT normal within the past year, stable to baseline (2020)  -IOP excellent today (12/11) and historically  -Baseline VF normal 2021, consider repeating in the future for comparison  -Reviewed with pt, will continue to monitor with annual eye exam. No Tx recommended at this time    2.) Myopia/Astigmatism/Presbyopia each eye  -No change in spec Rx. Lost glasses - Rx given today  -Has full Rx and computer progressive  -Very occasional CL wear    Monitor 1 year dilated eye exam, sooner prn    I have confirmed the patient's CC, HPI and reviewed Past Medical History, Past Surgical History, Social History, Family History, Problem List, Medication List and agree with Tech note.     Rox Redd, OD FAAO FSLS

## (undated) DEVICE — SUCTION MANIFOLD NEPTUNE 2 SYS 1 PORT 702-025-000

## (undated) DEVICE — ENDO SNARE POLYPECTOMY OVAL 10MM LOOP SD-240U-10

## (undated) DEVICE — GOWN IMPERVIOUS 2XL BLUE

## (undated) DEVICE — SPECIMEN CONTAINER 3OZ W/FORMALIN 59901

## (undated) DEVICE — SOL WATER IRRIG 1000ML BOTTLE 2F7114

## (undated) DEVICE — ENDO TRAP POLYP E-TRAP 00711099

## (undated) DEVICE — TUBING SUCTION 12"X1/4" N612

## (undated) RX ORDER — METOPROLOL TARTRATE 1 MG/ML
INJECTION, SOLUTION INTRAVENOUS
Status: DISPENSED
Start: 2019-11-04

## (undated) RX ORDER — FENTANYL CITRATE 50 UG/ML
INJECTION, SOLUTION INTRAMUSCULAR; INTRAVENOUS
Status: DISPENSED
Start: 2019-10-21

## (undated) RX ORDER — DOBUTAMINE HYDROCHLORIDE 200 MG/100ML
INJECTION INTRAVENOUS
Status: DISPENSED
Start: 2019-11-04

## (undated) RX ORDER — ATROPINE SULFATE 0.4 MG/ML
AMPUL (ML) INJECTION
Status: DISPENSED
Start: 2019-11-04